# Patient Record
Sex: MALE | Race: WHITE | Employment: OTHER | URBAN - METROPOLITAN AREA
[De-identification: names, ages, dates, MRNs, and addresses within clinical notes are randomized per-mention and may not be internally consistent; named-entity substitution may affect disease eponyms.]

---

## 2017-05-31 ENCOUNTER — TRANSFERRED RECORDS (OUTPATIENT)
Dept: HEALTH INFORMATION MANAGEMENT | Facility: CLINIC | Age: 67
End: 2017-05-31

## 2017-05-31 LAB
ALT SERPL-CCNC: 29 U/L (ref 9–46)
CHOLEST SERPL-MCNC: 190 MG/DL (ref 125–200)
CREAT SERPL-MCNC: 1.7 MG/DL (ref 0.7–1.25)
GFR SERPL CREATININE-BSD FRML MDRD: 41 ML/MIN/1.73M2
GLUCOSE SERPL-MCNC: 130 MG/DL (ref 65–99)
HBA1C MFR BLD: 6.5 % (ref 4–6)
HDLC SERPL-MCNC: 67 MG/DL
LDLC SERPL CALC-MCNC: 91 MG/DL
POTASSIUM SERPL-SCNC: 4.6 MMOL/L (ref 3.5–5.3)
TRIGL SERPL-MCNC: 162 MG/DL

## 2017-11-22 ENCOUNTER — TRANSFERRED RECORDS (OUTPATIENT)
Dept: HEALTH INFORMATION MANAGEMENT | Facility: CLINIC | Age: 67
End: 2017-11-22

## 2018-07-03 ENCOUNTER — OFFICE VISIT (OUTPATIENT)
Dept: ENDOCRINOLOGY | Facility: CLINIC | Age: 68
End: 2018-07-03
Payer: MEDICARE

## 2018-07-03 VITALS
SYSTOLIC BLOOD PRESSURE: 112 MMHG | HEIGHT: 73 IN | DIASTOLIC BLOOD PRESSURE: 70 MMHG | HEART RATE: 60 BPM | BODY MASS INDEX: 32.2 KG/M2 | WEIGHT: 243 LBS

## 2018-07-03 DIAGNOSIS — E11.319 TYPE 2 DIABETES MELLITUS WITH BOTH EYES AFFECTED BY RETINOPATHY WITHOUT MACULAR EDEMA, WITH LONG-TERM CURRENT USE OF INSULIN, UNSPECIFIED RETINOPATHY SEVERITY (H): ICD-10-CM

## 2018-07-03 DIAGNOSIS — E78.5 HYPERLIPIDEMIA LDL GOAL <100: Primary | ICD-10-CM

## 2018-07-03 DIAGNOSIS — Z79.4 TYPE 2 DIABETES MELLITUS WITH BOTH EYES AFFECTED BY RETINOPATHY WITHOUT MACULAR EDEMA, WITH LONG-TERM CURRENT USE OF INSULIN, UNSPECIFIED RETINOPATHY SEVERITY (H): ICD-10-CM

## 2018-07-03 LAB
ANION GAP SERPL CALCULATED.3IONS-SCNC: 11 MMOL/L (ref 3–14)
BUN SERPL-MCNC: 32 MG/DL (ref 7–30)
CALCIUM SERPL-MCNC: 9.1 MG/DL (ref 8.5–10.1)
CHLORIDE SERPL-SCNC: 106 MMOL/L (ref 94–109)
CO2 SERPL-SCNC: 21 MMOL/L (ref 20–32)
CREAT SERPL-MCNC: 1.75 MG/DL (ref 0.66–1.25)
CREAT UR-MCNC: 3 MG/DL
GFR SERPL CREATININE-BSD FRML MDRD: 39 ML/MIN/1.7M2
GLUCOSE SERPL-MCNC: 133 MG/DL (ref 70–99)
HBA1C MFR BLD: 6.6 % (ref 0–5.6)
MICROALBUMIN UR-MCNC: 10 MG/L
MICROALBUMIN/CREAT UR: 291.26 MG/G CR (ref 0–17)
POTASSIUM SERPL-SCNC: 4.8 MMOL/L (ref 3.4–5.3)
SODIUM SERPL-SCNC: 138 MMOL/L (ref 133–144)
TSH SERPL DL<=0.005 MIU/L-ACNC: 0.94 MU/L (ref 0.4–4)

## 2018-07-03 PROCEDURE — 83036 HEMOGLOBIN GLYCOSYLATED A1C: CPT | Performed by: INTERNAL MEDICINE

## 2018-07-03 PROCEDURE — 84443 ASSAY THYROID STIM HORMONE: CPT | Performed by: INTERNAL MEDICINE

## 2018-07-03 PROCEDURE — 99215 OFFICE O/P EST HI 40 MIN: CPT | Performed by: INTERNAL MEDICINE

## 2018-07-03 PROCEDURE — 36415 COLL VENOUS BLD VENIPUNCTURE: CPT | Performed by: INTERNAL MEDICINE

## 2018-07-03 PROCEDURE — 82043 UR ALBUMIN QUANTITATIVE: CPT | Performed by: INTERNAL MEDICINE

## 2018-07-03 PROCEDURE — 80048 BASIC METABOLIC PNL TOTAL CA: CPT | Performed by: INTERNAL MEDICINE

## 2018-07-03 RX ORDER — ROSUVASTATIN CALCIUM 20 MG/1
20 TABLET, COATED ORAL DAILY
Refills: 0 | COMMUNITY
Start: 2018-05-14

## 2018-07-03 RX ORDER — LIRAGLUTIDE 6 MG/ML
1.2 INJECTION SUBCUTANEOUS DAILY
Qty: 18 ML | Refills: 3 | Status: SHIPPED | OUTPATIENT
Start: 2018-07-03

## 2018-07-03 RX ORDER — LIRAGLUTIDE 6 MG/ML
INJECTION SUBCUTANEOUS
Refills: 1 | COMMUNITY
Start: 2018-03-27 | End: 2018-07-03

## 2018-07-03 RX ORDER — INSULIN DETEMIR 100 [IU]/ML
INJECTION, SOLUTION SUBCUTANEOUS
Refills: 3 | COMMUNITY
Start: 2018-03-27 | End: 2018-07-03

## 2018-07-03 RX ORDER — METOPROLOL TARTRATE 25 MG/1
TABLET, FILM COATED ORAL
Refills: 1 | COMMUNITY
Start: 2018-05-14

## 2018-07-03 RX ORDER — LISINOPRIL AND HYDROCHLOROTHIAZIDE 20; 25 MG/1; MG/1
TABLET ORAL
Refills: 0 | COMMUNITY
Start: 2018-05-14

## 2018-07-03 RX ORDER — INSULIN DETEMIR 100 [IU]/ML
INJECTION, SOLUTION SUBCUTANEOUS
Qty: 45 ML | Refills: 3 | Status: SHIPPED | OUTPATIENT
Start: 2018-07-03

## 2018-07-03 RX ORDER — NITROGLYCERIN 0.4 MG/1
TABLET SUBLINGUAL
Refills: 5 | COMMUNITY
Start: 2017-12-30

## 2018-07-03 NOTE — MR AVS SNAPSHOT
"              After Visit Summary   7/3/2018    Den Bell    MRN: [id number]           Patient Information     Date Of Birth          1950        Visit Information        Provider Department      7/3/2018 11:30 AM Ford Alegre MD [facility]        Today's Diagnoses     Hyperlipidemia LDL goal <100    -  1    Type 2 diabetes mellitus with both eyes affected by retinopathy without macular edema, with long-term current use of insulin, unspecified retinopathy severity (H)          Care Instructions    Continue current diabetes med plan with Levemir, Victoza, and low dose mealtime Humalog (high carb meals)   Levemir 46U each evening   Victoza 1.2 mg daily   Humalog:    Higher carb lunch and/or supper:  6-10 units    Plan future fasting \"lab appt\" to have lipid panel (cholesterol) levels tested  Try over the counter Hydrocortisone cream 1% to areas of itchy feet, use sparingly  Reviewed the benefit of using the [facility] [email or web address] portal messaging system for easier communication with me and the [facility] staff.    Followup diabetes evaluation in November or December 2018          Follow-ups after your visit        Follow-up notes from your care team     Return in about 4 months (around 11/3/2018).      Who to contact     If you have questions or need follow up information about today's clinic visit or your schedule please contact [facility] directly at [phone].  Normal or non-critical lab and imaging results will be communicated to you by MyChart, letter or phone within 4 business days after the clinic has received the results. If you do not hear from us within 7 days, please contact the clinic through MyChart or phone. If you have a critical or abnormal lab result, we will notify you by phone as soon as possible.  Submit refill requests through [email or web address] or call your pharmacy and they will forward the refill request to us. Please allow 3 business days for your refill to be completed. " "         Additional Information About Your Visit        MyChart Information     [employer] lets you send messages to your doctor, view your test results, renew your prescriptions, schedule appointments and more. To sign up, go to [email or web address] . Click on \"Log in\" on the left side of the screen, which will take you to the Welcome page. Then click on \"Sign up Now\" on the right side of the page.     You will be asked to enter the access code listed below, as well as some personal information. Please follow the directions to create your username and password.     Your access code is: [email or web address]  Expires: 10/1/2018 10:45 AM     Your access code will  in 90 days. If you need help or a new code, please call your [facility] clinic or [phone].        Care EveryWhere ID     This is your Care EveryWhere ID. This could be used by other organizations to access your [facility] medical records  [id number]        Your Vitals Were     Pulse Height BMI (Body Mass Index)             60 6' 1\" (1.854 m) 32.06 kg/m2          Blood Pressure from Last 3 Encounters:   18 112/70    Weight from Last 3 Encounters:   18 243 lb (110.2 kg)              We Performed the Following     Albumin Random Urine Quantitative with Creat Ratio     Basic metabolic panel     Hemoglobin A1c     TSH        Primary Care Provider Office Phone # Fax #    Rey EDDIE Garcia [phone]       [place] [address] [address] [facility] [address]        Equal Access to Services     KRYSTINA MILLER : Hadii aad ku hadasho Soomaali, waaxda luqadaha, qaybta kaalmada adeegyada, waxay idiin hayisidro gee . So [facility] [phone].    ATENCIÓN: Si habla español, tiene a rebolledo disposición servicios gratuitos de asistencia lingüística. Llame al [phone].    We comply with applicable federal civil rights laws and [place] laws. We do not discriminate on the basis of race, color, national origin, age, disability, sex, " sexual orientation, or gender identity.            Thank you!     Thank you for choosing [facility]  for your care. Our goal is always to provide you with excellent care. Hearing back from our patients is one way we can continue to improve our services. Please take a few minutes to complete the written survey that you may receive in the mail after your visit with us. Thank you!             Your Updated Medication List - Protect others around you: Learn how to safely use, store and throw away your medicines at www.disposemymeds.org.          This list is accurate as of 7/3/18 12:20 PM.  Always use your most recent med list.                   Brand Name Dispense Instructions for use Diagnosis    LEVEMIR FLEXTOUCH 100 UNIT/ML injection   Generic drug:  insulin detemir      INJECT 46 UNITS SUBQ IN THE EVENING        lisinopril-hydrochlorothiazide 20-25 MG per tablet    PRINZIDE/ZESTORETIC     TAKE 1 TABLET EVERY DAY        metoprolol tartrate 25 MG tablet    LOPRESSOR     TAKE 1 TABLET EVERY DAY        nitroGLYcerin 0.4 MG sublingual tablet    NITROSTAT     1 TAB UNDER TONGUE AT 1ST SIGN CHEST PAIN,IF NO RELIEF IN 5MIN CALL 911&REPEAT DOSE MAX 3DOSE/ 15MIN        rosuvastatin 20 MG tablet    CRESTOR     Take 20 mg by mouth daily        VICTOZA PEN 18 MG/3ML soln   Generic drug:  liraglutide      INJECT 0.2 MILLILITER (1.2 MG) BY SUBCUTANEOUS ROUTE ONCE DAILY FOR 90 DAYS

## 2018-07-03 NOTE — PROGRESS NOTES
Name: Den Bell is a 67 year old man, self-referred for evaluation of diabetes mellitus.  Previously seen by me at my former clinic ([facility]), here to establish care with [facility].    HPI:  Recent issues:  Here for f/u diabetes evaluation  Feeling well overall, but noticing higher BG levels past 2 months        1993. Diagnosis of diabetes mellitus, age 43  Initial treatment with oral DM meds, including metformin, glipizide, Avandia  9/15/04. Initial diabetes evaluation with me at my former clinic ([facility])  Continued on oral DM meds, then addition of basal insulin  Has taken glimeparide, Januvia, Actos previously  Medications changed to MDI insulin plan, has used Nv then H  Previously on Humalog as H6- H15- H18 schedule with Levemir 44U QHS  Considered Xultophy med, but not covered by insurance  Current DM meds:   Levemir 46U sq QHS   Victoza 1.2 mg QHS   Humalog Kwikpen 6-8U lunchtime, 10-12U suppertime    Humalog sscale?  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  Enjoys [employer] competition  Sees Dr. Jeffrey Garcia/[facility] for general medicine evaluations.    PMH/PSH:  Past Medical History:   Diagnosis Date     Back pain      Bicuspid aortic valve      CAD (coronary artery disease)      Colon polyp      Hyperlipidemia      Hypertension      MI (myocardial infarction)      Osteoarthritis      Type 2 diabetes mellitus with retinopathy (H)      Past Surgical History:   Procedure Laterality Date     BACK SURGERY       HEART CATH PTCA SINGLE VESSEL      details not available     NECK SURGERY         Family Hx:  No family history on  "file.      Social Hx:  Social History     Social History     Marital status:      Spouse name: N/A     Number of children: N/A     Years of education: N/A     Occupational History     Not on file.     Social History Main Topics     Smoking status: Never Smoker     Smokeless tobacco: Never Used     Alcohol use Yes     Drug use: No     Sexual activity: Not on file     Other Topics Concern     Not on file     Social History Narrative          MEDICATIONS:  has a current medication list which includes the following prescription(s): blood glucose monitoring, insulin lispro, insulin pen needle, levemir flextouch, lisinopril-hydrochlorothiazide, metoprolol tartrate, nitroglycerin, rosuvastatin, and victoza pen.    ROS:     ROS: 10 point ROS neg other than the symptoms noted above in the HPI.    GENERAL: mild fatigue, wt stable; denies fevers, chills, night sweats.   HEENT: no dysphagia, odonophagia, diplopia, neck pain  THYROID:  no apparent hyper or hypothyroid symptoms  CV: no chest pain, pressure, palpitations  LUNGS: no SOB, ROCHA, cough, wheezing   ABDOMEN: heartburn; no diarrhea, constipation, abdominal pain  EXTREMITIES: no rashes, ulcers, edema  NEUROLOGY: no headaches, denies changes in vision, tingling, extremitiy numbness   MSK: pains at right hip and right shoulder, back, sometimes fingers; no muscle weakness  SKIN: intermittent feet area itching; no rashes or lesions  : rare nocturia (if sleep aid pill), generally good urine flow  PSYCH:  stable mood, no significant anxiety or depression  ENDOCRINE: no heat or cold intolerance    Physical Exam   VS: /70 (BP Location: Right arm, Cuff Size: Adult Regular)  Pulse 60  Ht 6' 1\" (1.854 m)  Wt 243 lb (110.2 kg)  BMI 32.06 kg/m2  GENERAL: AXOX3, NAD, eyeglasses, well dressed, answering questions appropriately, appears stated age.  THYROID:  normal gland, no apparent nodules or goiter  HEENT: neck non-tender, no exopthalmous, no proptosis, EOMI  CV: " RRR, no rubs, gallops, no murmurs  LUNGS: CTAB, no wheezes, rales, or ronchi  ABDOMEN: obese, soft, nontender, nondistended  EXTREMITIES: no edema, +pedal pulses, no lesions  NEUROLOGY: CN grossly intact, + DTR lower extremity, no tremors  MSK: grossly intact  SKIN: mustache, scars at left ankle & knees, normal appearance of feet without skin lesions; no rashes, no lesions    LABS:    All pertinent notes, labs, and images personally reviewed by me.     A/P:  Encounter Diagnoses   Name Primary?     Type 2 diabetes mellitus with both eyes affected by retinopathy without macular edema, with long-term current use of insulin, unspecified retinopathy severity (H)      Hyperlipidemia LDL goal <100 Yes       Comments:  Reviewed health history and diabetes issues.  Recent BG trends variable, often higher in evenings, overall avg 175 mg/dl    Plan:  Discussed general issues with the diabetes diagnosis and management  We discussed the hgbA1c test which reflects previous overall glucose levels or control  Discussed the importance of blood glucose (BG) testing to assess glucose trends  Provided general overview of the diabetes medication options and medication treatment plan.    Recommend:  Continue current use of the Levemir, Victoza, and Humalog insulin medications  Advised more consistent dosing of the mealtime Humalog   Use for mod-high carb meals   Advise using approx 6-8U at mealtime (lunch vs suppertime)  Test BG levels more often, or consider use of CGMS device  Will update his DM meds/supplies to his [facility] pharmacy/[place] per his request  Reviewed the benefit of using the [employer] portal messaging system for easier communication with me and the [employer] staff.   Encouraged him to message me when additional BG trend updates  Try low dose hydrocortisone cream to feet itching areas  Need updated information on the current Crestor med dose, from his [place] physician Rx  Keep focus on diet, exercise, weight  "management.  Advise having fasting lipid panel testing and dilated eye examination, at least annually    Addressed patient questions today    Patient Instructions   Continue current diabetes med plan with Levemir, Victoza, and low dose mealtime Humalog (high carb meals)   Levemir 46U each evening   Victoza 1.2 mg daily   Humalog:    Higher carb lunch and/or supper:  6-10 units    Plan future fasting \"lab appt\" to have lipid panel (cholesterol) levels tested  Try over the counter Hydrocortisone cream 1% to areas of itchy feet, use sparingly  Reviewed the benefit of using the [employer] portal messaging system for easier communication with me and the [facility] staff.    Followup diabetes evaluation in November or December 2018    Labs ordered today:   Orders Placed This Encounter   Procedures     Basic metabolic panel     TSH     Hemoglobin A1c     Albumin Random Urine Quantitative with Creat Ratio     Radiology/Consults ordered today: None    More than 50% of the time spent with Mr. Bell on counseling / coordinating his care.  Total appointment time was 45 minutes.    Follow-up:  4 mo    Ford Alegre MD  Endocrinology  [facility]      "

## 2018-11-07 ENCOUNTER — HOSPITAL ENCOUNTER (EMERGENCY)
Facility: CLINIC | Age: 68
Discharge: HOME | End: 2018-11-08
Attending: EMERGENCY MEDICINE | Admitting: EMERGENCY MEDICINE
Payer: MEDICARE

## 2018-11-07 ENCOUNTER — APPOINTMENT (OUTPATIENT)
Dept: GENERAL RADIOLOGY | Facility: CLINIC | Age: 68
End: 2018-11-07
Attending: EMERGENCY MEDICINE
Payer: MEDICARE

## 2018-11-07 DIAGNOSIS — L03.011 CELLULITIS OF FINGER, RIGHT: ICD-10-CM

## 2018-11-07 PROCEDURE — 10120 INC&RMVL FB SUBQ TISS SMPL: CPT

## 2018-11-07 PROCEDURE — A9270 NON-COVERED ITEM OR SERVICE: HCPCS | Mod: GY | Performed by: EMERGENCY MEDICINE

## 2018-11-07 PROCEDURE — 73140 X-RAY EXAM OF FINGER(S): CPT | Mod: RT

## 2018-11-07 PROCEDURE — 25000132 ZZH RX MED GY IP 250 OP 250 PS 637: Mod: GY | Performed by: EMERGENCY MEDICINE

## 2018-11-07 PROCEDURE — 99282 EMERGENCY DEPT VISIT SF MDM: CPT | Mod: 25

## 2018-11-07 RX ORDER — SULFAMETHOXAZOLE/TRIMETHOPRIM 800-160 MG
1 TABLET ORAL ONCE
Status: COMPLETED | OUTPATIENT
Start: 2018-11-07 | End: 2018-11-07

## 2018-11-07 RX ORDER — CEPHALEXIN 500 MG/1
500 CAPSULE ORAL ONCE
Status: COMPLETED | OUTPATIENT
Start: 2018-11-07 | End: 2018-11-07

## 2018-11-07 RX ORDER — SULFAMETHOXAZOLE/TRIMETHOPRIM 800-160 MG
1 TABLET ORAL 2 TIMES DAILY
Qty: 14 TABLET | Refills: 0 | Status: SHIPPED | OUTPATIENT
Start: 2018-11-07 | End: 2018-11-14

## 2018-11-07 RX ORDER — CEPHALEXIN 500 MG/1
500 CAPSULE ORAL 4 TIMES DAILY
Qty: 28 CAPSULE | Refills: 0 | Status: SHIPPED | OUTPATIENT
Start: 2018-11-07 | End: 2018-11-14

## 2018-11-07 RX ADMIN — CEPHALEXIN 500 MG: 500 CAPSULE ORAL at 23:53

## 2018-11-07 RX ADMIN — SULFAMETHOXAZOLE AND TRIMETHOPRIM 1 TABLET: 800; 160 TABLET ORAL at 23:53

## 2018-11-07 ASSESSMENT — ENCOUNTER SYMPTOMS
COLOR CHANGE: 1
JOINT SWELLING: 1
FEVER: 0
CHILLS: 0
MYALGIAS: 1

## 2018-11-07 NOTE — ED AVS SNAPSHOT
[facility]    [address]    [facility] [address]    Phone:  [phone]    Fax:  [phone]                                       Den Bell   MRN: [id number]    Department:  [facility]   Date of Visit:  11/7/2018           After Visit Summary Signature Page     I have received my discharge instructions, and my questions have been answered. I have discussed any challenges I see with this plan with the nurse or doctor.    ..........................................................................................................................................  Patient/Patient Representative Signature      ..........................................................................................................................................  Patient Representative Print Name and Relationship to Patient    ..................................................               ................................................  Date                                   Time    ..........................................................................................................................................  Reviewed by Signature/Title    ...................................................              ..............................................  Date                                               Time          22EPIC Rev 08/18

## 2018-11-07 NOTE — ED AVS SNAPSHOT
[facility] Emergency Department    [address]    [facility] [address]    Phone:  [phone]    Fax:  [phone]                                       Den Bell   MRN: [id number]    Department:  [facility] Emergency Department   Date of Visit:  11/7/2018           Patient Information     Date Of Birth          1950        Your diagnoses for this visit were:     Cellulitis of finger, right        You were seen by Robb Christiansen MD.      Follow-up Information     Follow up with Red Morales MD In 1 day.    Specialty:  Orthopedics    Why:  for a recheck of your finger infection    Contact information:    [facility]  [address]  [facility] [address]  [phone]          Discharge Instructions         Cellulitis  Cellulitis is an infection of the deep layers of skin. A break in the skin, such as a cut or scratch, can let bacteria under the skin. If the bacteria get to deep layers of the skin, it can be serious. If not treated, cellulitis can get into the bloodstream and lymph nodes. The infection can then spread throughout the body. This causes serious illness.  Cellulitis causes the affected skin to become red, swollen, warm, and sore. The reddened areas have a visible border. An open sore may leak fluid (pus). You may have a fever, chills, and pain.  Cellulitis is treated with antibiotics taken for 7 to 10 days. An open sore may be cleaned and covered with cool wet gauze. Symptoms should get better 1 to 2 days after treatment is started. Make sure to take all the antibiotics for the full number of days until they are gone. Keep taking the medicine even if your symptoms go away.  Home care  Follow these tips:    Limit the use of the part of your body with cellulitis.     If the infection is on your leg, keep your leg raised while sitting. This will help to reduce swelling.    Take all of the antibiotic medicine exactly as  directed until it is gone. Do not miss any doses, especially during the first 7 days. Don t stop taking the medicine when your symptoms get better.    Keep the affected area clean and dry.    Wash your hands with soap and warm water before and after touching your skin. Anyone else who touches your skin should also wash his or her hands. Don't share towels.  Follow-up care  Follow up with your healthcare provider, or as advised. If your infection does not go away on the first antibiotic, your healthcare provider will prescribe a different one.  When to seek medical advice  Call your healthcare provider right away if any of these occur:    Red areas that spread    Swelling or pain that gets worse    Fluid leaking from the skin (pus)    Fever higher of 100.4  F (38.0  C) or higher after 2 days on antibiotics  Date Last Reviewed: 9/1/2016 2000-2018 [employer]. [address] [place] [address]. All rights reserved. This information is not intended as a substitute for professional medical care. Always follow your healthcare professional's instructions.          24 Hour Appointment Hotline       To make an appointment at any [facility], call [phone] ([phone]). If you don't have a family doctor or clinic, we will help you find one. Only clinics are conveniently located to serve the needs of you and your family.             Review of your medicines      START taking        Dose / Directions Last dose taken    bacitracin ointment   Quantity:  10 g        Apply topically 2 times daily To right middle finger nail fold and where foreign body was removed.   Refills:  1        cephALEXin 500 MG capsule   Commonly known as:  KEFLEX   Dose:  500 mg   Quantity:  28 capsule        Take 1 capsule (500 mg) by mouth 4 times daily for 7 days   Refills:  0        sulfamethoxazole-trimethoprim 800-160 MG per tablet   Commonly known as:  BACTRIM DS   Dose:  1 tablet   Quantity:  14 tablet         Take 1 tablet by mouth 2 times daily for 7 days   Refills:  0          Our records show that you are taking the medicines listed below. If these are incorrect, please call your family doctor or clinic.        Dose / Directions Last dose taken    blood glucose monitoring test strip   Commonly known as:  no brand specified   Quantity:  270 strip        Use to test blood sugar 3 times daily or as directed.   Refills:  3        insulin lispro 100 UNIT/ML injection   Commonly known as:  HumaLOG KWIKpen   Quantity:  45 mL        Inject 6-10 units sq at mealtimes, as directed   Refills:  3        insulin pen needle 32G X 4 MM   Commonly known as:  BD GARDENIA U/F   Quantity:  400 each        Use 4 pen needles daily or as directed.   Refills:  3        LEVEMIR FLEXTOUCH 100 UNIT/ML injection   Quantity:  45 mL   Generic drug:  insulin detemir        Inject 46-50 units sq at bedtime, as directed   Refills:  3        lisinopril-hydrochlorothiazide 20-25 MG per tablet   Commonly known as:  PRINZIDE/ZESTORETIC        TAKE 1 TABLET EVERY DAY   Refills:  0        metoprolol tartrate 25 MG tablet   Commonly known as:  LOPRESSOR        TAKE 1 TABLET EVERY DAY   Refills:  1        nitroGLYcerin 0.4 MG sublingual tablet   Commonly known as:  NITROSTAT        1 TAB UNDER TONGUE AT 1ST SIGN CHEST PAIN,IF NO RELIEF IN 5MIN CALL 911&REPEAT DOSE MAX 3DOSE/ 15MIN   Refills:  5        rosuvastatin 20 MG tablet   Commonly known as:  CRESTOR   Dose:  20 mg        Take 20 mg by mouth daily   Refills:  0        VICTOZA PEN 18 MG/3ML soln   Dose:  1.2 mg   Quantity:  18 mL   Generic drug:  liraglutide        Inject 1.2 mg Subcutaneous daily   Refills:  3        ZETIA PO        Refills:  0                Prescriptions were sent or printed at these locations (3 Prescriptions)                   Other Prescriptions                Printed at Department/Unit printer (3 of 3)         bacitracin ointment               cephALEXin (KEFLEX) 500 MG capsule                sulfamethoxazole-trimethoprim (BACTRIM DS) 800-160 MG per tablet                Procedures and tests performed during your visit     XR Finger Right G/E 2 Views      Orders Needing Specimen Collection     None      Pending Results     Date and Time Order Name Status Description    11/7/2018 2332 XR Finger Right G/E 2 Views Preliminary             Pending Culture Results     No orders found for last 3 day(s).            Pending Results Instructions     If you had any lab results that were not finalized at the time of your Discharge, you can call the ED Lab Result RN at [phone]. You will be contacted by this team for any positive Lab results or changes in treatment. The nurses are available 7 days a week from 10A to 6:30P.  You can leave a message 24 hours per day and they will return your call.        Test Results From Your Hospital Stay        11/8/2018 12:00 AM      Narrative     RIGHT THIRD FINGER 3 VIEWS  11/7/2018 11:49 PM     HISTORY: Finger infection. Evaluate for foreign body and gas in the  soft tissues.    COMPARISON: None.        Impression     IMPRESSION:  1. No visualized radiopaque foreign object or convincing gas within  the soft tissues of the right third finger.  2. Mild degenerative changes in the proximal and distal  interphalangeal joints.                Clinical Quality Measure: Blood Pressure Screening     Your blood pressure was checked while you were in the emergency department today. The last reading we obtained was  BP: (!) 190/123 . Please read the guidelines below about what these numbers mean and what you should do about them.  If your systolic blood pressure (the top number) is less than 120 and your diastolic blood pressure (the bottom number) is less than 80, then your blood pressure is normal. There is nothing more that you need to do about it.  If your systolic blood pressure (the top number) is 120-139 or your diastolic blood pressure (the bottom number) is 80-89,  "your blood pressure may be higher than it should be. You should have your blood pressure rechecked within a year by a primary care provider.  If your systolic blood pressure (the top number) is 140 or greater or your diastolic blood pressure (the bottom number) is 90 or greater, you may have high blood pressure. High blood pressure is treatable, but if left untreated over time it can put you at risk for heart attack, stroke, or kidney failure. You should have your blood pressure rechecked by a primary care provider within the next 4 weeks.  If your provider in the emergency department today gave you specific instructions to follow-up with your doctor or provider even sooner than that, you should follow that instruction and not wait for up to 4 weeks for your follow-up visit.        Thank you for choosing [facility]       Thank you for choosing [facility] for your care. Our goal is always to provide you with excellent care. Hearing back from our patients is one way we can continue to improve our services. Please take a few minutes to complete the written survey that you may receive in the mail after you visit with us. Thank you!        [email or web address] Information     [email or web address] lets you send messages to your doctor, view your test results, renew your prescriptions, schedule appointments and more. To sign up, go to [email or web address] . Click on \"Log in\" on the left side of the screen, which will take you to the Welcome page. Then click on \"Sign up Now\" on the right side of the page.     You will be asked to enter the access code listed below, as well as some personal information. Please follow the directions to create your username and password.     Your access code is: [email or web address]  Expires: 2019 12:56 AM     Your access code will  in 90 days. If you need help or a new code, please call your [facility] clinic or [phone].        Care EveryWhere ID     This is your Care EveryWhere ID. This could be used by other " organizations to access your [facility] medical records  [id number]        Equal Access to Services     KRYSTINA MILLER : Marcel Levin, ravindra iverson, murali naranjo. So [facility] [phone].    ATENCIÓN: Si habla español, tiene a rebolledo disposición servicios gratuitos de asistencia lingüística. Llame al [phone].    We comply with applicable federal civil rights laws and [place] laws. We do not discriminate on the basis of race, color, national origin, age, disability, sex, sexual orientation, or gender identity.            After Visit Summary       This is your record. Keep this with you and show to your community pharmacist(s) and doctor(s) at your next visit.

## 2018-11-08 VITALS
RESPIRATION RATE: 18 BRPM | SYSTOLIC BLOOD PRESSURE: 132 MMHG | DIASTOLIC BLOOD PRESSURE: 92 MMHG | HEART RATE: 98 BPM | OXYGEN SATURATION: 95 % | TEMPERATURE: 98.6 F

## 2018-11-08 RX ORDER — BACITRACIN ZINC 500 [USP'U]/G
OINTMENT TOPICAL 2 TIMES DAILY
Qty: 10 G | Refills: 1 | Status: SHIPPED | OUTPATIENT
Start: 2018-11-08

## 2018-11-08 NOTE — ED PROVIDER NOTES
History     Chief Complaint:  Hand Pain    HPI   Den Bell is a 68 year old male with a history of insulin dependent diabetes who presents to the emergency department today for evaluation of right third finger pain and redness. The patient reports he was deer hunting this past weekend where he was walking through the woods and thinks he could have had a splinter or thorn in his finger. Today, he was working out in the yard and was around more thorns. He has noticed increased swelling and redness to his right third finger. Over the past three hours, he has noticed worsening swelling and pain to the finger. He was concerned about the swelling and pain prompting his visit to the emergency department. At arrival, he notes the finger does feel warm to the touch.  He denies fever, chills, and exposure to eleni thorn in particular. Of note, the patient notes his sugars have been normal lately with his A1C in the 6 to 7 range.     Allergies:  No Known Drug Allergies    Medications:    Ezetimibe (ZETIA PO)  LEVEMIR FLEXTOUCH 100 UNIT/ML injection  lisinopril-hydrochlorothiazide (PRINZIDE/ZESTORETIC) 20-25 MG per tablet  metoprolol tartrate (LOPRESSOR) 25 MG tablet  nitroGLYcerin (NITROSTAT) 0.4 MG sublingual tablet  rosuvastatin (CRESTOR) 20 MG tablet  VICTOZA PEN 18 MG/3ML soln  insulin lispro (HUMALOG KWIKPEN) 100 UNIT/ML injection    Past Medical History:    Back pain  Bicuspid aortic valve  CAD  Colon polyp  Hyperlipidemia  Hypertension  MI  Osteoarthritis  Type II diabetes    Past Surgical History:    Back surgery  Foot surgery  Heart Cath PTCA Single Vessel  Joint replacement  Neck surgery    Family History:    History reviewed. No pertinent family history.     Social History:  The patient was alone.  Smoking Status: Never  Smokeless Tobacco: Never  Alcohol Use: Yes  Marital Status:       Review of Systems   Constitutional: Negative for chills and fever.   Musculoskeletal: Positive for joint swelling and  myalgias.   Skin: Positive for color change.   All other systems reviewed and are negative.    Physical Exam     Patient Vitals for the past 24 hrs:   BP Temp Pulse Resp SpO2   11/08/18 0045 (!) 132/92 - - - 95 %   11/08/18 0030 147/90 - - - 95 %   11/08/18 0015 (!) 133/99 - - - 95 %   11/07/18 2319 (!) 190/123 98.6  F (37  C) 98 18 100 %         Physical Exam  General: Patient is alert and interactive when I enter the room  Head:  The scalp, face, and head appear normal  Eyes:  Conjunctivae are normal  ENT:    The nose is normal    Pinnae are normal    External acoustic canals are normal  Neck:  Trachea midline  CV:  Pulses are normal right radial and ulnar, capillary refill is normal in right  3rd finger. .   Resp:  No respiratory distress   Musc:  Normal muscular tone    No major joint effusions    No asymmetric leg swelling    Good capillary refill noted    Right middle finger is swollen. Mildly red. Slightly warm to the touch. Small black area on the extensor surface that looks like an embedded foreign body. Pulp of the finger is soft and there is no felon. There is no paronychia noted. No pain to palpation of the flexor tendons in the palm. No pain with full motion of the fingers.   Skin:  No rash or lesions noted  Neuro: Speech is normal and fluent. Face is symmetric.     Moving all extremities well.   Psych:  Awake. Alert.  Normal affect.  Appropriate interactions.    Emergency Department Course   Imaging:  Radiology findings were communicated with the patient who voiced understanding of the findings.  XR Finger Right G/E 2 views  IMPRESSION:  1. No visualized radiopaque foreign object or convincing gas within  the soft tissues of the right third finger.  2. Mild degenerative changes in the proximal and distal  interphalangeal joints.  Report per radiology     Procedures:  [facility] Procedure Note:  Physician: Robb Christiansen MD    PROCEDURE: Removal of foreign body from right middle finger  CONSENT:  Verbal  INDICATION:  Imbedded foreign body in right middle finger, presumed to be splinter or plant material  POST-PROCEDURE DIAGNOSIS: successful removal of  foreign body from right middle finger.   PHYSICIAN:  Robb Christiansen MD  DESCRIPTION OF PROCEDURE:    Informed verbal consent. Site was correctly identified. #11 blade was used for foreign body removal. Foreign body was removed by gently lifting the small splinter or piece of plant material out of the wound. There is no bleeding after removal of the foreign body.  The foreign body was successfully removed.     Interventions:  2353 Bactrim 800-160 tablet 1 tablet PO  2353 Keflex 500 mg PO    Emergency Department Course:  Nursing notes and vitals reviewed.  The patient was sent for a XR Finger Right G/E 2 views while in the emergency department, results above.   2326: I performed an exam of the patient as documented above.   2355: Patient rechecked and updated. I performed a foreign body removal procedure as noted above.   Findings and plan explained to the Patient. Patient discharged home with instructions regarding supportive care, medications, and reasons to return. The importance of close follow-up was reviewed. The patient was prescribed Bacitracin ointment, Keflex, and Bactrim.   I personally reviewed the imaging results with the Patient and answered all related questions prior to discharge.    Impression & Plan    Medical Decision Making:  Den Bell is a 68 year old male who presents for evaluation of skin redness to his right middle finger.  The history, physical exam and supporting data are consistent with cellulitis.  There do not appear at this time to be any complication of cellulitis including necrotizing fascitis, lymphangitis, lymphadenitis, abscess, osteomyelitis, sepsis, or shock.  There are no signs of flexor tenosynovitis, felon, or paronychia that I can drain at this point. The patient is not immunosuppressed.  Supportive outpatient  management is indicated with antibiotics.  Close follow-up of primary care physician to ensure no progression and rapid resolution.  Area of cellulitis was outlined.  Cellulitis precautions for home.      Diagnosis:    ICD-10-CM    1. Cellulitis of finger, right L03.011        Disposition:  discharged to home    Discharge Medications:  New Prescriptions    BACITRACIN OINTMENT    Apply topically 2 times daily To right middle finger nail fold and where foreign body was removed.    CEPHALEXIN (KEFLEX) 500 MG CAPSULE    Take 1 capsule (500 mg) by mouth 4 times daily for 7 days    SULFAMETHOXAZOLE-TRIMETHOPRIM (BACTRIM DS) 800-160 MG PER TABLET    Take 1 tablet by mouth 2 times daily for 7 days       Scribe Disclosure:  Robert GRAHAM, am serving as a scribe at 11:26 PM on 11/7/2018 to document services personally performed by Robb Christiansen MD based on my observations and the provider's statements to me.     11/7/2018   [facility] EMERGENCY DEPARTMENT       Robb Christiansen MD  11/08/18 0120

## 2018-11-08 NOTE — DISCHARGE INSTRUCTIONS
Cellulitis  Cellulitis is an infection of the deep layers of skin. A break in the skin, such as a cut or scratch, can let bacteria under the skin. If the bacteria get to deep layers of the skin, it can be serious. If not treated, cellulitis can get into the bloodstream and lymph nodes. The infection can then spread throughout the body. This causes serious illness.  Cellulitis causes the affected skin to become red, swollen, warm, and sore. The reddened areas have a visible border. An open sore may leak fluid (pus). You may have a fever, chills, and pain.  Cellulitis is treated with antibiotics taken for 7 to 10 days. An open sore may be cleaned and covered with cool wet gauze. Symptoms should get better 1 to 2 days after treatment is started. Make sure to take all the antibiotics for the full number of days until they are gone. Keep taking the medicine even if your symptoms go away.  Home care  Follow these tips:    Limit the use of the part of your body with cellulitis.     If the infection is on your leg, keep your leg raised while sitting. This will help to reduce swelling.    Take all of the antibiotic medicine exactly as directed until it is gone. Do not miss any doses, especially during the first 7 days. Don t stop taking the medicine when your symptoms get better.    Keep the affected area clean and dry.    Wash your hands with soap and warm water before and after touching your skin. Anyone else who touches your skin should also wash his or her hands. Don't share towels.  Follow-up care  Follow up with your healthcare provider, or as advised. If your infection does not go away on the first antibiotic, your healthcare provider will prescribe a different one.  When to seek medical advice  Call your healthcare provider right away if any of these occur:    Red areas that spread    Swelling or pain that gets worse    Fluid leaking from the skin (pus)    Fever higher of 100.4  F (38.0  C) or higher after 2 days  on antibiotics  Date Last Reviewed: 9/1/2016 2000-2018 [employer]. [address] [place] [address], [place] [address]. All rights reserved. This information is not intended as a substitute for professional medical care. Always follow your healthcare professional's instructions.

## 2018-11-08 NOTE — ED TRIAGE NOTES
Here with swelling of the right hand today becoming worse this evening Has a very small a thorn noted to the first joint area Diabetic A1C 6 to 7 Hand is quite painful

## 2019-03-25 ENCOUNTER — TELEPHONE (OUTPATIENT)
Dept: ENDOCRINOLOGY | Facility: CLINIC | Age: 69
End: 2019-03-25

## 2019-03-25 DIAGNOSIS — Z79.4 TYPE 2 DIABETES MELLITUS WITH BOTH EYES AFFECTED BY RETINOPATHY WITHOUT MACULAR EDEMA, WITH LONG-TERM CURRENT USE OF INSULIN, UNSPECIFIED RETINOPATHY SEVERITY (H): Primary | ICD-10-CM

## 2019-03-25 DIAGNOSIS — E11.319 TYPE 2 DIABETES MELLITUS WITH BOTH EYES AFFECTED BY RETINOPATHY WITHOUT MACULAR EDEMA, WITH LONG-TERM CURRENT USE OF INSULIN, UNSPECIFIED RETINOPATHY SEVERITY (H): Primary | ICD-10-CM

## 2019-03-25 NOTE — TELEPHONE ENCOUNTER
Fax from [id number] in [facility]    Levemir Flextouch is no longer formulary.    Preferred is Lantus Solostar.    LOV 7-3-18 TL, f/u due Nov/Dec 2018 - not done.      Per fax from pharmacy, patient has address in [place].  Does patient travel for winter?  Permanent move?      Akila Jamil, RT (R)

## 2019-03-25 NOTE — TELEPHONE ENCOUNTER
Called and spoke with pt. Pt reports that his legal residence is in [place] but he spends the summer in [place]. Pt has been doing this since 2013. Pt plans to continue seeing  for his diabetes. Pt aware that he missed seeing  in the Fall before going to [place].     Pt advised that levemir is no longer formulary and that lantus solostar is preferred by insurance.     Will route to  for review and to approve new medication if appropriate

## 2019-03-26 NOTE — TELEPHONE ENCOUNTER
Message noted.  OK to change the basal insulin Rx to Lantus Solostar, but patient overdue for his diabetes evaluation appt.  I have signed the Lantus Rx to his pharmacy.    YASH Alegre MD  Endocrinology  [facility]

## 2019-07-08 DIAGNOSIS — E11.319 TYPE 2 DIABETES MELLITUS WITH BOTH EYES AFFECTED BY RETINOPATHY WITHOUT MACULAR EDEMA, WITH LONG-TERM CURRENT USE OF INSULIN, UNSPECIFIED RETINOPATHY SEVERITY (H): ICD-10-CM

## 2019-07-08 DIAGNOSIS — Z79.4 TYPE 2 DIABETES MELLITUS WITH BOTH EYES AFFECTED BY RETINOPATHY WITHOUT MACULAR EDEMA, WITH LONG-TERM CURRENT USE OF INSULIN, UNSPECIFIED RETINOPATHY SEVERITY (H): ICD-10-CM

## 2019-07-09 NOTE — TELEPHONE ENCOUNTER
"Last Written Prescription Date:  3/26/19  Last Fill Quantity: 15 mL,  # refills: 5   Last office visit: 7/3/2018 with prescribing provider:  Codey   Future Office Visit:      Requested Prescriptions   Pending Prescriptions Disp Refills     LANTUS SOLOSTAR 100 UNIT/ML soln [Pharmacy Med Name: LANTUS SOLOSTAR 100 UNIT/ML]  0     Sig: INJECT 46 UNITS SUBCUTANEOUSLY AT BEDTIME       Long Acting Insulin Protocol Failed - 7/8/2019  6:13 PM        Failed - Blood pressure less than 140/90 in past 6 months     BP Readings from Last 3 Encounters:   11/08/18 (!) 132/92   07/03/18 112/70                 Failed - LDL on file in past 12 months     Recent Labs   Lab Test 05/31/17   LDL 91             Failed - Serum creatinine on file in past 12 months     Recent Labs   Lab Test 07/03/18  1217   CR 1.75*             Failed - HgbA1C in past 3 or 6 months     If HgbA1C is 8 or greater, it needs to be on file within the past 3 months.  If less than 8, must be on file within the past 6 months.     Recent Labs   Lab Test 07/03/18  1217   A1C 6.6*             Failed - Recent (6 mo) or future (30 days) visit within the authorizing provider's specialty     Patient had office visit in the last 6 months or has a visit in the next 30 days with authorizing provider or within the authorizing provider's specialty.  See \"Patient Info\" tab in inbasket, or \"Choose Columns\" in Meds & Orders section of the refill encounter.            Passed - Microalbumin on file in past 12 months     Recent Labs   Lab Test 07/03/18  1217   MICROL 10   UMALCR 291.26*             Passed - Medication is active on med list        Passed - Patient is age 18 or older          "

## 2019-07-10 RX ORDER — INSULIN GLARGINE 100 [IU]/ML
INJECTION, SOLUTION SUBCUTANEOUS
Qty: 15 ML | Refills: 0 | Status: SHIPPED | OUTPATIENT
Start: 2019-07-10

## 2019-07-10 NOTE — TELEPHONE ENCOUNTER
Medication is being filled for 1 time refill only due to:  Patient needs to be seen because it has been more than one year since last visit.   Ju DO RN

## 2019-09-18 ENCOUNTER — OFFICE VISIT (OUTPATIENT)
Dept: URGENT CARE | Facility: URGENT CARE | Age: 69
End: 2019-09-18
Payer: MEDICARE

## 2019-09-18 VITALS
DIASTOLIC BLOOD PRESSURE: 79 MMHG | HEART RATE: 66 BPM | SYSTOLIC BLOOD PRESSURE: 110 MMHG | OXYGEN SATURATION: 96 % | TEMPERATURE: 98.2 F

## 2019-09-18 DIAGNOSIS — Z79.4 TYPE 2 DIABETES MELLITUS WITH BOTH EYES AFFECTED BY RETINOPATHY WITHOUT MACULAR EDEMA, WITH LONG-TERM CURRENT USE OF INSULIN, UNSPECIFIED RETINOPATHY SEVERITY (H): ICD-10-CM

## 2019-09-18 DIAGNOSIS — E11.319 TYPE 2 DIABETES MELLITUS WITH BOTH EYES AFFECTED BY RETINOPATHY WITHOUT MACULAR EDEMA, WITH LONG-TERM CURRENT USE OF INSULIN, UNSPECIFIED RETINOPATHY SEVERITY (H): ICD-10-CM

## 2019-09-18 DIAGNOSIS — R05.8 PRODUCTIVE COUGH: Primary | ICD-10-CM

## 2019-09-18 PROCEDURE — 99204 OFFICE O/P NEW MOD 45 MIN: CPT | Performed by: PHYSICIAN ASSISTANT

## 2019-09-18 RX ORDER — AMOXICILLIN 875 MG
875 TABLET ORAL 2 TIMES DAILY
Qty: 20 TABLET | Refills: 0 | Status: SHIPPED | OUTPATIENT
Start: 2019-09-18 | End: 2019-09-28

## 2019-09-18 ASSESSMENT — ENCOUNTER SYMPTOMS
SHORTNESS OF BREATH: 0
CHILLS: 0
SORE THROAT: 0
COUGH: 1
VOMITING: 0
DIARRHEA: 0
FEVER: 1
WHEEZING: 0

## 2019-09-18 NOTE — PROGRESS NOTES
SUBJECTIVE:   Den Bell is a 69 year old male presenting with a chief complaint of   Chief Complaint   Patient presents with     Urgent Care     URI     Productive Cough- lot of phlegm, cold, nasal discharge, low grade fever. Sx x1 week       He is a new patient of [facility].    URI Adult    Onset of symptoms was 10 day(s) ago.  Course of illness is worsening.    Severity moderate  Current and Associated symptoms: cough - productive, low-grade fever, nasal congestion  Denies sinus pain or pressure.  Treatment measures tried include OTC Cough med.  Predisposing factors include None.  No v/d    Patient is a type II diabetic.  Last hemoglobin A1C 6.6% 7/3/2018.  He reports elevated creatinine 1.75 for the past couple years.  Upon chart review, last GFR 39 on 7/3/2018.      Review of Systems   Constitutional: Positive for fever (low grade). Negative for chills.   HENT: Negative for sinus pressure, sinus pain and sore throat.    Eyes: Negative for visual disturbance.   Respiratory: Positive for cough. Negative for shortness of breath and wheezing.    Cardiovascular: Negative for chest pain.   Gastrointestinal: Negative for diarrhea and vomiting.   Musculoskeletal: Negative for joint swelling.   Skin: Negative for rash.   Allergic/Immunologic: Negative for immunocompromised state.   Neurological: Negative for dizziness and headaches.   Hematological: Does not bruise/bleed easily.   Psychiatric/Behavioral: Negative for confusion.       Past Medical History:   Diagnosis Date     Back pain      Bicuspid aortic valve      CAD (coronary artery disease)      Colon polyp      Hyperlipidemia      Hypertension      MI (myocardial infarction) (H)      Osteoarthritis      Type 2 diabetes mellitus with retinopathy (H)      History reviewed. No pertinent family history.  Current Outpatient Medications   Medication Sig Dispense Refill     amoxicillin (AMOXIL) 875 MG tablet Take 1 tablet (875 mg) by mouth 2 times daily for 10 days 20  tablet 0     Ezetimibe (ZETIA PO)        insulin lispro (HUMALOG KWIKPEN) 100 UNIT/ML injection Inject 6-10 units sq at mealtimes, as directed 45 mL 3     LANTUS SOLOSTAR 100 UNIT/ML soln INJECT 46 UNITS SUBCUTANEOUSLY AT BEDTIME 15 mL 0     LEVEMIR FLEXTOUCH 100 UNIT/ML injection Inject 46-50 units sq at bedtime, as directed 45 mL 3     lisinopril-hydrochlorothiazide (PRINZIDE/ZESTORETIC) 20-25 MG per tablet TAKE 1 TABLET EVERY DAY  0     metoprolol tartrate (LOPRESSOR) 25 MG tablet TAKE 1 TABLET EVERY DAY  1     nitroGLYcerin (NITROSTAT) 0.4 MG sublingual tablet 1 TAB UNDER TONGUE AT 1ST SIGN CHEST PAIN,IF NO RELIEF IN 5MIN CALL 911&REPEAT DOSE MAX 3DOSE/ 15MIN  5     rosuvastatin (CRESTOR) 20 MG tablet Take 20 mg by mouth daily  0     VICTOZA PEN 18 MG/3ML soln Inject 1.2 mg Subcutaneous daily 18 mL 3     bacitracin ointment Apply topically 2 times daily To right middle finger nail fold and where foreign body was removed. (Patient not taking: Reported on 9/18/2019) 10 g 1     blood glucose monitoring (NO BRAND SPECIFIED) test strip Use to test blood sugar 3 times daily or as directed. 270 strip 3     insulin pen needle (BD GARDENIA U/F) 32G X 4 MM Use 4 pen needles daily or as directed. 400 each 3     Social History     Tobacco Use     Smoking status: Never Smoker     Smokeless tobacco: Never Used   Substance Use Topics     Alcohol use: Yes       OBJECTIVE  /79 (BP Location: Right arm, Patient Position: Chair, Cuff Size: Adult Large)   Pulse 66   Temp 98.2  F (36.8  C) (Oral)   SpO2 96%     Physical Exam   Constitutional: He appears well-developed and well-nourished. No distress.   HENT:   Head: Normocephalic and atraumatic.   Right Ear: Tympanic membrane and external ear normal.   Left Ear: Tympanic membrane and external ear normal.   Mouth/Throat: Oropharynx is clear and moist.   Eyes: Conjunctivae are normal.   Neck: Normal range of motion.   Cardiovascular: Regular rhythm and normal heart sounds.    Pulmonary/Chest: Effort normal and breath sounds normal. No respiratory distress. He has no wheezes. He has no rales.   Few basilar coarse breath sounds    Neurological: He is alert.   Skin: Skin is warm and dry.   Psychiatric: He has a normal mood and affect.   Nursing note and vitals reviewed.      Labs:  No results found for this or any previous visit (from the past 24 hour(s)).        ASSESSMENT:      ICD-10-CM    1. Productive cough R05 amoxicillin (AMOXIL) 875 MG tablet   2. Type 2 diabetes mellitus with both eyes affected by retinopathy without macular edema, with long-term current use of insulin, unspecified retinopathy severity (H) E11.319     Z79.4           PLAN:    Productive cough: We have elected to treat today given length and worsening of symptoms.  Amoxicillin is prescribed.  No dosage adjustment needed based on calculated creatinine clearance today.  Continue OTC cough medication as needed.  Follow-up if any worsening symptoms.  Patient agrees with the plan.  Type II diabetic: Last Hemoglobin A1C  6.6%.  Continue with current plan of care.  Patient agrees with plan.    Followup:    If not improving or if condition worsens, follow up with your Primary Care Provider

## 2019-09-19 ASSESSMENT — ENCOUNTER SYMPTOMS
HEADACHES: 0
JOINT SWELLING: 0
SINUS PRESSURE: 0
BRUISES/BLEEDS EASILY: 0
DIZZINESS: 0
SINUS PAIN: 0
CONFUSION: 0

## 2019-10-16 NOTE — PATIENT INSTRUCTIONS
"Continue current diabetes med plan with Levemir, Victoza, and low dose mealtime Humalog (high carb meals)   Levemir 46U each evening   Victoza 1.2 mg daily   Humalog:    Higher carb lunch and/or supper:  6-10 units    Plan future fasting \"lab appt\" to have lipid panel (cholesterol) levels tested  Try over the counter Hydrocortisone cream 1% to areas of itchy feet, use sparingly  Reviewed the benefit of using the [employer] portal messaging system for easier communication with me and the [facility] staff.    Followup diabetes evaluation in November or December 2018  " [Cough] : cough [Negative] : Gastrointestinal

## 2024-08-04 ENCOUNTER — NURSE TRIAGE (OUTPATIENT)
Dept: NURSING | Facility: CLINIC | Age: 74
End: 2024-08-04
Payer: MEDICARE

## 2024-08-04 NOTE — TELEPHONE ENCOUNTER
Nurse Triage SBAR    Is this a 2nd Level Triage? No    Situation/Background: Spouse is calling that the patient needs an injection of Vitamin K. Spouse states they are traveling and are in the [place], [place] area at the time of the call.  INR is 5.1 at time of call. Patient is on another phone speaking with his On-Call PCP at [facility]. Spouse is asking if patient can go to ED for injection. At this point, the patient stated that the [facility] Provider recommended the patient go to the [facility] for the injection.     Assessment:   No triage.     Recommendation: Per disposition, No triage. Spouse stated that she no longer needed assistance. Spouse was advised to call back if additional concerns.     Protocol Recommended Disposition: Telephone advice    Radha Cordon RN on 8/4/2024 at 5:39 PM  [facility] Nurse [facility]visors  Reason for Disposition    General information question, no triage required and triager able to answer question    Protocols used: Information Only Call - [facility]

## 2025-09-02 ENCOUNTER — HOSPITAL ENCOUNTER (EMERGENCY)
Facility: CLINIC | Age: 75
Discharge: HOME | End: 2025-09-02
Attending: STUDENT IN AN ORGANIZED HEALTH CARE EDUCATION/TRAINING PROGRAM | Admitting: STUDENT IN AN ORGANIZED HEALTH CARE EDUCATION/TRAINING PROGRAM
Payer: MEDICARE

## 2025-09-02 VITALS
TEMPERATURE: 97.7 F | DIASTOLIC BLOOD PRESSURE: 104 MMHG | WEIGHT: 228.84 LBS | BODY MASS INDEX: 30.33 KG/M2 | HEIGHT: 73 IN | SYSTOLIC BLOOD PRESSURE: 118 MMHG | HEART RATE: 70 BPM | RESPIRATION RATE: 16 BRPM | OXYGEN SATURATION: 100 %

## 2025-09-02 DIAGNOSIS — R19.7 DIARRHEA, UNSPECIFIED TYPE: Primary | ICD-10-CM

## 2025-09-02 PROBLEM — I63.9 STROKE-IN-EVOLUTION SYNDROME (H): Status: ACTIVE | Noted: 2024-07-14

## 2025-09-02 PROBLEM — H53.462 LEFT HOMONYMOUS HEMIANOPSIA DUE TO RECENT CEREBRAL INFARCTION: Status: ACTIVE | Noted: 2024-07-15

## 2025-09-02 PROBLEM — I69.398 LEFT HOMONYMOUS HEMIANOPSIA DUE TO RECENT CEREBRAL INFARCTION: Status: ACTIVE | Noted: 2024-07-15

## 2025-09-02 PROBLEM — Z95.2 H/O MECHANICAL AORTIC VALVE REPLACEMENT: Status: ACTIVE | Noted: 2024-07-15

## 2025-09-02 LAB
ALBUMIN SERPL BCG-MCNC: 4.1 G/DL (ref 3.5–5.2)
ALP SERPL-CCNC: 71 U/L (ref 40–150)
ALT SERPL W P-5'-P-CCNC: 44 U/L (ref 0–70)
ANION GAP SERPL CALCULATED.3IONS-SCNC: 8 MMOL/L (ref 7–15)
AST SERPL W P-5'-P-CCNC: 31 U/L (ref 0–45)
BASOPHILS # BLD AUTO: 0.05 10E3/UL (ref 0–0.2)
BASOPHILS NFR BLD AUTO: 0.6 %
BILIRUB SERPL-MCNC: 0.6 MG/DL
BUN SERPL-MCNC: 19.4 MG/DL (ref 8–23)
CALCIUM SERPL-MCNC: 9 MG/DL (ref 8.8–10.4)
CHLORIDE SERPL-SCNC: 107 MMOL/L (ref 98–107)
CREAT SERPL-MCNC: 1.82 MG/DL (ref 0.67–1.17)
EGFRCR SERPLBLD CKD-EPI 2021: 38 ML/MIN/1.73M2
EOSINOPHIL # BLD AUTO: 0.28 10E3/UL (ref 0–0.7)
EOSINOPHIL NFR BLD AUTO: 3.5 %
ERYTHROCYTE [DISTWIDTH] IN BLOOD BY AUTOMATED COUNT: 12.6 % (ref 10–15)
GLUCOSE SERPL-MCNC: 111 MG/DL (ref 70–99)
HCO3 SERPL-SCNC: 25 MMOL/L (ref 22–29)
HCT VFR BLD AUTO: 40.4 % (ref 40–53)
HGB BLD-MCNC: 13.5 G/DL (ref 13.3–17.7)
HOLD SPECIMEN: NORMAL
HOLD SPECIMEN: NORMAL
IMM GRANULOCYTES # BLD: 0.04 10E3/UL
IMM GRANULOCYTES NFR BLD: 0.5 %
LYMPHOCYTES # BLD AUTO: 0.73 10E3/UL (ref 0.8–5.3)
LYMPHOCYTES NFR BLD AUTO: 9.1 %
MCH RBC QN AUTO: 31.2 PG (ref 26.5–33)
MCHC RBC AUTO-ENTMCNC: 33.4 G/DL (ref 31.5–36.5)
MCV RBC AUTO: 93.3 FL (ref 78–100)
MONOCYTES # BLD AUTO: 0.88 10E3/UL (ref 0–1.3)
MONOCYTES NFR BLD AUTO: 11 %
NEUTROPHILS # BLD AUTO: 6.04 10E3/UL (ref 1.6–8.3)
NEUTROPHILS NFR BLD AUTO: 75.3 %
NRBC # BLD AUTO: <0.03 10E3/UL
NRBC BLD AUTO-RTO: 0 /100
PLATELET # BLD AUTO: 192 10E3/UL (ref 150–450)
POTASSIUM SERPL-SCNC: 4.4 MMOL/L (ref 3.4–5.3)
PROT SERPL-MCNC: 6.4 G/DL (ref 6.4–8.3)
RBC # BLD AUTO: 4.33 10E6/UL (ref 4.4–5.9)
SODIUM SERPL-SCNC: 140 MMOL/L (ref 135–145)
WBC # BLD AUTO: 8.02 10E3/UL (ref 4–11)

## 2025-09-02 PROCEDURE — 80053 COMPREHEN METABOLIC PANEL: CPT | Performed by: STUDENT IN AN ORGANIZED HEALTH CARE EDUCATION/TRAINING PROGRAM

## 2025-09-02 PROCEDURE — 99283 EMERGENCY DEPT VISIT LOW MDM: CPT | Performed by: STUDENT IN AN ORGANIZED HEALTH CARE EDUCATION/TRAINING PROGRAM

## 2025-09-02 PROCEDURE — 85004 AUTOMATED DIFF WBC COUNT: CPT | Performed by: STUDENT IN AN ORGANIZED HEALTH CARE EDUCATION/TRAINING PROGRAM

## 2025-09-02 PROCEDURE — 36415 COLL VENOUS BLD VENIPUNCTURE: CPT | Performed by: STUDENT IN AN ORGANIZED HEALTH CARE EDUCATION/TRAINING PROGRAM

## 2025-09-02 ASSESSMENT — COLUMBIA-SUICIDE SEVERITY RATING SCALE - C-SSRS
2. HAVE YOU ACTUALLY HAD ANY THOUGHTS OF KILLING YOURSELF IN THE PAST MONTH?: NO
1. IN THE PAST MONTH, HAVE YOU WISHED YOU WERE DEAD OR WISHED YOU COULD GO TO SLEEP AND NOT WAKE UP?: NO
6. HAVE YOU EVER DONE ANYTHING, STARTED TO DO ANYTHING, OR PREPARED TO DO ANYTHING TO END YOUR LIFE?: NO

## 2025-09-02 ASSESSMENT — ACTIVITIES OF DAILY LIVING (ADL)
ADLS_ACUITY_SCORE: 41